# Patient Record
Sex: FEMALE | Race: WHITE
[De-identification: names, ages, dates, MRNs, and addresses within clinical notes are randomized per-mention and may not be internally consistent; named-entity substitution may affect disease eponyms.]

---

## 2019-06-14 NOTE — EKG
[place] [address]
Phone:  [phone]                     ELECTROCARDIOGRAM REPORT      
_______________________________________________________________________________
 
Name:       MERRY CASTILLO                 Room:                      REG ER  
M.R.#:  [id number]      Account #:      [id number]  
Admission:  19     Attend Phys:                         
Discharge:               Date of Birth:  [birth year]  
         Report #: [id number]
    [id number]
_______________________________________________________________________________
THIS REPORT FOR:  //name//                      
 
                         [facility]
                                       
Test Date:    2019               Test Time:    13:53:32
Pat Name:     MERRY CASTILLO             Department:   
Patient ID:   [id number]            Room:          
Gender:       F                        Technician:   GIOVANNA
:          1945               Requested By: Hannah Bates
Order Number: [id number] MD:   Emeterio Leos
                                 Measurements
Intervals                              Axis          
Rate:         74                       P:            23
MO:           146                      QRS:          16
QRSD:         92                       T:            40
QT:           424                                    
QTc:          471                                    
                           Interpretive Statements
Sinus rhythm
Low voltage, precordial leads
No previous ECG available for comparison
 
Electronically Signed On 2019 14:57:02 CDT by Emeterio Leos
[email or web address]
 
 
 
 
 
 
 
 
 
 
 
 
 
 
 
 
 
 
 
  <ELECTRONICALLY SIGNED>
                                           By: Emeterio Leos MD, [facility]      
  19     1457
D: 19 1353   _____________________________________
T: 19 1353   Emeterio Leos MD, FACC        /EPI

## 2019-06-16 NOTE — CON
[address]  [address]                    CONSULTATION                  
_______________________________________________________________________________
 
Name:       JONATHANMERRY J                 Room:           [address] [place]  
.R.#:  [id number]      Account #:      [id number]  
Admission:  06/14/19     Attend Phys:    NELDA Fulton
Discharge:               Date of Birth:  [birth year]  
         Report #: [id number]
                                                                     [id number]  
_______________________________________________________________________________
THIS REPORT FOR:  //name//                      
 
CC: SIOBHAN physician/PCP
    Luis Dobbins
 
CARDIOLOGY CONSULTATION
 
HISTORY OF PRESENT ILLNESS:  I was asked by Dr. Dobbins to see this 73-year-old
white female in cardiology consultation for evaluation and treatment of atrial
fibrillation with a rapid ventricular response.  This lady was admitted on
06/14/2019 with an acute GI bleed.  She had a severe GI bleed and was
hypotensive and actually had syncope with it.  Apparently, she has a bleeding
duodenal ulcer.  She was intubated in the ER the day of the admission to protect
her airway apparently.  She does have a history of COPD also.  She does, in
fact, have a past history of atrial fibrillation as well.  She was in sinus
rhythm until this morning.  Of note, is that they have had difficulty managing
this lady's blood pressure.  She has remained hypotensive since her admission. 
She has required Levophed.  She also has required DuoNeb that she has been
getting every 4 hours.  This morning, she became hypotensive again.  Last night,
her hemoglobin was 8.8 at 10:00 last night.  This morning with this event, her
hemoglobin did drop to 7.1.  The presumption is that she has bled again.  She
went into atrial fibrillation with a rapid ventricular response earlier this
morning, initially it was thought to be paroxysmal supraventricular tachycardia,
because it was extremely rapid and fairly regular, although on close
examination, the rhythm is in fact not completely regular.  Her initial heart
rate was 190.  She was given adenosine and metoprolol and the heart rate slowed
and has remained a bit slower, but she was running up to 150 at times and it
became clear it was atrial fibrillation.  She is on 12 mcg of Levophed and
cannot get diltiazem or a beta blocker for this rhythm, so she has gotten a
couple of doses of digoxin.  If she fails to respond with a lower heart rate
with digoxin, then we will have to give her amiodarone.  She was given a bolus
of fluids.  She is less hypotensive now, she is going to get blood.  She is
currently stable, but remains critically ill.
 
PAST MEDICAL HISTORY:  Also includes history of CVA in 2006, I believe it was on
the right side of her head.  She has history of essential hypertension, diabetes
mellitus and hypercholesterolemia and was on medication for those.  She has
insulin-dependent diabetes mellitus.  She cannot give a history, she is
intubated and is sedated and on ventilator.
 
PAST MEDICAL HISTORY:  As described above.
 
ALLERGIES:  She is allergic to METFORMIN AND PENICILLIN.
 
REVIEW OF SYSTEMS:  She cannot give review of systems.
 
 
 
 
[place] [address]                    CONSULTATION                  
_______________________________________________________________________________
 
Name:       MERRY CASTILLO                 Room:           [address] [place]  
[facility]#:  [id number]      Account #:      [id number]  
Admission:  06/14/19     Attend Phys:    NELDA Fulton
Discharge:               Date of Birth:  [birth year]  
         Report #: [id number]
                                                                     [id number]  
_______________________________________________________________________________
SOCIAL HISTORY:  She cannot give social history or family history.  Apparently,
she does not smoke or drink or use illegal drugs.
 
PHYSICAL EXAMINATION:
GENERAL:  She presents as a well-developed, well-nourished white female in no
acute distress.  She is intubated, sedated and on the ventilator.  She is
unresponsive.
VITAL SIGNS:  Her pulse is currently 118, her systolic pressure is 100,
respirations are 16, and she is afebrile.  Her most recent temperature was 98.1.
HEENT:  Her head is atraumatic.  Eyes clear.
NECK:  Supple.  There is no jugular venous distention or hepatojugular reflux. 
Thyroid is not enlarged.  There is no adenopathy.
SKIN:  Warm and dry.  Mucous membranes are moist.
LUNGS:  Clear to auscultation and percussion.
HEART:  Revealed normal first and second heart sound.  There is no S4, no S3, no
murmurs, rubs, thrills, heaves.  The rhythm is irregularly irregular, rate is
approximately 120.  PMI is not displaced.
ABDOMEN:  Soft, flat, nontender, no palpable masses, no organomegaly.
EXTREMITIES:  Reveal no cyanosis, clubbing or edema.
NEUROLOGIC:  The patient was unresponsive and did not move her extremities.  She
is intubated and sedated.
 
DIAGNOSTIC STUDIES:  Her most recent EKG from this morning at 5:20 a.m. shows
atrial fibrillation.  There is a possible old anteroseptal infarct.  Her heart
rate was 123 at that time.  Chest x-ray from yesterday showed clear lungs.  I do
not think she has had one yet today.  On yesterday's x-ray, the cardiac
silhouette was normal.  ET tube was in normal position.
 
IMPRESSION:
1.  Atrial fibrillation with a rapid ventricular response.
2.  Hypovolemic shock.
3.  Gastrointestinal bleed.
4.  Chronic obstructive pulmonary disease with acute respiratory failure.
5.  Anemia.
6.  History of essential hypertension.
7.  Insulin-dependent diabetes mellitus.
8.  Hypercholesterolemia.
9.  History of cerebrovascular accident.
 
RECOMMENDATION:  She has already gotten a couple of doses of digoxin.  If her
heart rate does not begin to slow further, I would start her on amiodarone to
slow her and hopefully convert her back to sinus rhythm.  She should not be
anticoagulated.  I would transfuse her.  I would notify GI that she is probably
having a GI bleed.  She has already gotten a bolus of fluids.  She may require
more fluids.  I would discontinue DuoNeb and switch her to Xopenex, as it is
less arrhythmogenic.  If her blood pressure comes up, I would taper her off the
 
 
 
[address] [place] [address]                    CONSULTATION                  
_______________________________________________________________________________
 
Name:       MERRY CASTILLO                 Room:           [address]    [place]  
..#:  [id number]      Account #:      [id number]  
Admission:  06/14/19     Attend Phys:    NELDA Fulton
Discharge:               Date of Birth:  [birth year]  
         Report #: [id number]
                                                                     [id number]  
_______________________________________________________________________________
Levophed, as it is also arrhythmogenic.  I would consider Amadeo-Synephrine in
place of it, if she continues needing pressor support.  Amadeo-Synephrine is not
arrhythmogenic.
 
Thank you very much for asking me to the patient.  If any questions, please feel
free to contact me.
 
 
 
 
 
 
 
 
 
 
 
 
 
 
 
 
 
 
 
 
 
 
 
 
 
 
 
 
 
 
 
 
 
 
 
 
 
 
<ELECTRONICALLY SIGNED>
                                        By:  Emeterio Leos MD, [facility]      
06/16/19     2041
D: 06/16/19 0833_______________________________________
T: 06/16/19 0925F. Tom Talley MD, FACC       /nt

## 2019-06-17 NOTE — CON
[address]  [address]                    CONSULTATION                  
_______________________________________________________________________________
 
Name:       JONATHANMERRY J                 Room:           [address]    [place]  
M.R.#:  [id number]      Account #:      [id number]  
Admission:  06/14/19     Attend Phys:    NELDA Fulton
Discharge:               Date of Birth:  [birth year]  
         Report #: [id number]
                                                                     [id number]  
_______________________________________________________________________________
THIS REPORT FOR:  //name//                      
 
CC: SIOBHAN physician/PCP
    Luis Dobbins
 
DATE OF SERVICE:  06/15/2019
 
 
REASON FOR CONSULT:  Gastrointestinal bleed.
 
HISTORY OF PRESENT ILLNESS:  This is a 73-year-old female who was admitted to
hospital last night with hematemesis.  The patient apparently was found
unconscious at gas station and was brought to the hospital by EMS.  Upon
hospitalization, the patient was alert, but reported that she was tired.  Her
hemoglobin was in range of 8, but she was transfused since she was vomiting a
lot of blood.  Since then, the patient has been intubated and is on pressors. 
She is maxed out on Levophed of 30.  She still is extremely hypotensive and
tachycardic with heart rate of 130s.  She also has leukocytosis with WBC in
range of mid 20s.  She initially was on propofol, but she has been off of
propofol and is not responsive.  She has a NG tube in place, which has some dark
blood in it.  The patient also is on Coumadin and she was on hypercoagulable
state with INR of 5.5 on admission.
 
Since her ER visit, we had ordered to reverse INR with FFP and her INR this
morning is 1.3.  Post-transfusion, her hemoglobin went up to 10, but this
morning had dropped to 6.  We also had started her on a Protonix drip since
admission.
 
PAST MEDICAL HISTORY:  Significant for history of hyperlipidemia, diabetes,
hypertension, atrial fibrillation, history of cerebrovascular accident in 2006
and chronic anticoagulation therapy.
 
ALLERGIES:  Significant to METFORMIN AND PENICILLIN.
 
SOCIAL HISTORY:  Unknown as the patient is nonresponsive.
 
FAMILY HISTORY:  Unknown as well.
 
PHYSICAL EXAMINATION:
GENERAL:  Reveals a 73-year-old female with acute gastrointestinal bleeds, who
is intubated, off of sedation, but nonresponsive; tachycardic and hypotensive,
on Levophed.
VITAL SIGNS:  Include blood pressure of 89/41, respirations 21, pulse 128 and
temperature is 100.2.
LUNGS:  Bilateral breath sounds are heard, note the patient is intubated.
CARDIOVASCULAR:  Tachycardic.  Regular rate.
 
 
 
[place] [address]                    CONSULTATION                  
_______________________________________________________________________________
 
Name:       MERRY CASTILLO PILO                 Room:           [address] [place]  
[facility]#:  [id number]      Account #:      [id number]  
Admission:  06/14/19     Attend Phys:    NELDA Fulton
Discharge:               Date of Birth:  [birth year]  
         Report #: [id number]
                                                                     [id number]  
_______________________________________________________________________________
ABDOMEN:  Soft.  Bowel sounds are present.  There is no rebound or guarding.
NEUROLOGIC:  The patient is off sedation, but nonresponsive.
 
LABORATORY DATA:  Reveal sodium of 144, potassium 3.5, BUN is 27, creatinine
1.2, glucose is 419, AST is 29, ALT 31, alkaline phosphatase 71 and calcium is
7.7.  Albumin is 2.3.  Lactic acid is 5.2 with troponin of 0.07.  INR is 1.3. 
WBC is 15.3 with hemoglobin of 6.0 and platelets of 171.
 
IMAGING:  CT of abdomen and pelvis was obtained on admission.  There is a large
left pneumothorax noted on presentation and has been addressed with placing a
chest tube.  There was a large amount of mixed density fluid and some gas in the
stomach, which may be consistent with upper gastrointestinal bleed.  There was
also cholelithiasis and small amount of free fluid in the pelvis.
 
ASSESSMENT AND PLAN:  The patient with multiple medical problems who presents
with acute gastrointestinal bleed and shock, who is on maximum dose of pressors
and intubated.  Since her hemoglobin dropped from 10 to 6, we will go ahead and
perform upper endoscopy.  We continue to monitor H and H and keep hemoglobin
between 7 and 8.  Meanwhile, we will continue Protonix drip and make further
recommendation based on upper endoscopic finding.
 
 
 
 
 
 
 
 
 
 
 
 
 
 
 
 
 
 
 
 
 
 
 
 
<ELECTRONICALLY SIGNED>
                                        By:  Steffen Almanzar MD            
06/17/19     1538
D: 06/15/19 1203_______________________________________
T: 06/15/19 1840Steffen Almanzar MD               /nt

## 2019-06-17 NOTE — EKG
[place] [address]
Phone:  [phone]                     ELECTROCARDIOGRAM REPORT      
_______________________________________________________________________________
 
Name:       MERRY CASTILLO                 Room:           [address]    [place]  
M.R.#:  [id number]      Account #:      [id number]  
Admission:  19     Attend Phys:    NELDA Fulton
Discharge:               Date of Birth:  [birth year]  
         Report #: [id number]
    [id number]
_______________________________________________________________________________
THIS REPORT FOR:  //name//                      
 
                          [facility]
                                       
Test Date:    2019               Test Time:    05:02:41
Pat Name:     MERRY CASTILLO             Department:   
Patient ID:   [id number]            Room:         [address]
Gender:       F                        Technician:   FERMIN
:          1945               Requested By: Luis Dobbins
Order Number: [id number]    Keven MD:   Emeterio Leos
                                 Measurements
Intervals                              Axis          
Rate:         190                      P:            0
DC:                                    QRS:          0
QRSD:         71                       T:            182
QT:           245                                    
QTc:          436                                    
                           Interpretive Statements
Supraventricular tachycardia
Low voltage, extremity and precordial leads
Repolarization abnormality, prob rate related
 
 
Electronically Signed On 2019 13:57:57 CDT by Emeterio Leos
[email or web address]
 
 
 
 
 
 
 
 
 
 
 
 
 
 
 
 
 
 
  <ELECTRONICALLY SIGNED>
                                           By: Emeterio Leos MD, [facility]      
  19     1357
D: [address]   _____________________________________
T: 19   Emeterio Leos MD, FACC        /EPI

## 2019-06-17 NOTE — EKG
[place] [address]
Phone:  [phone]                     ELECTROCARDIOGRAM REPORT      
_______________________________________________________________________________
 
Name:       MERRY CASTILLO                 Room:           [address]    [place]  
M.R.#:  [id number]      Account #:      [id number]  
Admission:  19     Attend Phys:    NELDA Fulton
Discharge:               Date of Birth:  [birth year]  
         Report #: [id number]
    [id number]
_______________________________________________________________________________
THIS REPORT FOR:  //name//                      
 
                          [facility]
                                       
Test Date:    2019-06-15               Test Time:    04:43:05
Pat Name:     MERRY CASTILLO             Department:   
Patient ID:   [id number]            Room:         [address]
Gender:       F                        Technician:   FERMIN
:          1945               Requested By: Luis Dobbins
Order Number: [id number]    Keven MD:   Emeterio Leos
                                 Measurements
Intervals                              Axis          
Rate:         110                      P:            -7
IA:           147                      QRS:          23
QRSD:         83                       T:            239
QT:           343                                    
QTc:          465                                    
                           Interpretive Statements
Sinus tachycardia
Low voltage, precordial leads
Nonspecific repol abnormality, diffuse leads
Compared to ECG 2019 13:53:32
Early repolarization now present
Sinus rhythm no longer present
 
Electronically Signed On 2019 13:46:59 CDT by Emeterio Leos
[email or web address]
 
 
 
 
 
 
 
 
 
 
 
 
 
 
 
 
  <ELECTRONICALLY SIGNED>
                                           By: Emeterio Leos MD, FAC      
  19     1346
D: 06/15/19 0443   _____________________________________
T: 06/15/19 0443   Emeterio Leos MD, [facility]        /EPI

## 2019-06-17 NOTE — EKG
[place] [address]
Phone:  [phone]                     ELECTROCARDIOGRAM REPORT      
_______________________________________________________________________________
 
Name:       MERRY CASTILLO                 Room:           [address]    [place]  
M.R.#:  [id number]      Account #:      [id number]  
Admission:  19     Attend Phys:    NELDA Fulton
Discharge:               Date of Birth:  [birth year]  
         Report #: [id number]
    [id number]
_______________________________________________________________________________
THIS REPORT FOR:  //name//                      
 
                          [facility]
                                       
Test Date:    2019               Test Time:    09:13:03
Pat Name:     MERRY CASTILLO             Department:   
Patient ID:   [id number]            Room:         [address]
Gender:       F                        Technician:   
:          1945               Requested By: JASKARAN Talley
Order Number: [id number]    Keven MD:   Emeterio Leos
                                 Measurements
Intervals                              Axis          
Rate:         75                       P:            16
NM:           150                      QRS:          29
QRSD:         81                       T:            33
QT:           474                                    
QTc:          530                                    
                           Interpretive Statements
Sinus rhythm
Low voltage, extremity leads
Prolonged QT interval
Baseline wander in lead(s) II,aVF
 
 
Electronically Signed On 2019 16:54:33 CDT by Emeterio Leos
[email or web address]
 
 
 
 
 
 
 
 
 
 
 
 
 
 
 
 
 
  <ELECTRONICALLY SIGNED>
                                           By: Emeterio Leos MD, [facility]      
  19     1654
D: 19   _____________________________________
T: 19   Emeterio Leos MD, FAC        /EPI

## 2019-06-17 NOTE — EKG
[place] [address]
Phone:  [phone]                     ELECTROCARDIOGRAM REPORT      
_______________________________________________________________________________
 
Name:       MERRY CASTILLO                 Room:           [address]    [place]  
M.R.#:  [id number]      Account #:      [id number]  
Admission:  19     Attend Phys:    NELDA Fulton
Discharge:               Date of Birth:  [birth year]  
         Report #: [id number]
    [id number]
_______________________________________________________________________________
THIS REPORT FOR:  //name//                      
 
                          [facility]
                                       
Test Date:    2019               Test Time:    09:05:26
Pat Name:     MERRY CASTILLO             Department:   
Patient ID:   [id number]            Room:         [address]
Gender:       F                        Technician:   [facility]
:          1945               Requested By: Sarmad Gaffney
Order Number: [id number]    Keven MD:   Emeterio Leos
                                 Measurements
Intervals                              Axis          
Rate:         121                      P:            57
MS:           144                      QRS:          2
QRSD:         73                       T:            211
QT:           291                                    
QTc:          413                                    
                           Interpretive Statements
Sinus tachycardia
Low voltage, extremity and precordial leads
septal infarct, old
Borderline repolarization abnormality
 
 
Electronically Signed On 2019 14:30:18 CDT by Emeterio Leos
[email or web address]
 
 
 
 
 
 
 
 
 
 
 
 
 
 
 
 
 
  <ELECTRONICALLY SIGNED>
                                           By: Emeterio Leos MD, [facility]      
  19     1430
D: 19   _____________________________________
T: 19   Emeterio Leos MD, FACC        /EPI

## 2019-06-17 NOTE — CON
[address]  [address]                    CONSULTATION                  
_______________________________________________________________________________
 
Name:       MERRY CASTILLO                 Room:           [address]    ADM IN  
M.R.#:  [id number]      Account #:      [id number]  
Admission:  06/14/19     Attend Phys:    NELDA Fulton
Discharge:               Date of Birth:  [birth year]  
         Report #: [id number]
                                                                     [id number]  
_______________________________________________________________________________
THIS REPORT FOR:  //name//                      
 
CC: SIOBHAN physician/PCP
    Luis Dobbins
 
REASON FOR CONSULTATION:  Respiratory failure, ventilator management.
 
HISTORY OF PRESENT ILLNESS:  Please note the patient is intubated, on sedation
during my visit, did not participate in the history; discussed with the nursing
staff and reviewed medical record.  She is a 73-year-old female patient, who was
brought in by EMS.  Apparently, she had episodes of vomiting and loss of
consciousness while she was in a gas station.  EMS reported that she was
unconscious, although she gained consciousness en route to the emergency room. 
Apparently, she did not recall any knowledge about what happened.  She has a
C-collar in place.  She denied abdominal pain, fever, or rash to the admitting
physician; however, at one point, she was intubated for airway protection.  She
had a central line placed and pneumothorax was found.  She also ended up with a
chest tube placement.  She still has gastric tube with some bloody secretions. 
During my visit, she was on Levophed, although they were going down on it.  She
was on 2 mcg of Levophed compared to 5 overnight.  She is on 6 mg Versed.
 
ALLERGIES:  Per the record, PENICILLIN AND METFORMIN.
 
PAST MEDICAL HISTORY:  Includes atrial fibrillation, diabetes mellitus, stroke,
and hysterectomy.
 
PAST SURGICAL HISTORY:  Includes hysterectomy as above.
 
HOME MEDICATIONS:  Aspirin, calcium, metoprolol, Coumadin.
 
FAMILY HISTORY:  Not obtainable.
 
REVIEW OF SYSTEMS:  Not obtainable due to the patient's condition.
 
PHYSICAL EXAMINATION:
GENERAL:  She is intubated, on sedation, no distress, chest tube in place, ET
tube in place.
HEENT:  Oral cavity, moist mucous membrane; head, normocephalic, atraumatic;
slightly pale; pupils reactive to light; external ears look normal; nasal
cavity, patent passages.
NECK:  Full range of movement, nontender.  No masses felt, no rebound, no
rigidity.
CHEST:  Air movement heard bilaterally; no crackles, no wheezes; chest tube on
the left side with very rare occasional leak.
HEART:  S1 and S2, no murmur.
ABDOMEN:  Benign, soft, lax, nontender, positive bowel sounds.
 
 
 
[place] [address]                    CONSULTATION                  
_______________________________________________________________________________
 
Name:       MERRY CASTILLO                 Room:           [address]#:  [id number]      Account #:      [id number]  
Admission:  06/14/19     Attend Phys:    NELDA Fulton
Discharge:               Date of Birth:  [birth year]  
         Report #: [id number]
                                                                     [id number]  
_______________________________________________________________________________
EXTREMITIES:  Lower extremity, no edema noted.  I did not take the sedation off.
SKIN:  No rash.
LYMPHATICS:  No palpable lymph node.
NEUROLOGIC:  Sedated.
 
LABORATORY DATA:  White blood count 16.7, hemoglobin 9.5, and platelets of 265. 
Her INR was 5.5; however, post hospitalization today is 1.4.  ABGs, she had
multiple sets of ABGs reviewed.  The last ABG this morning, 7.48/26/170 and that
was on 550 of tidal volume, 50% FiO2, and rate of 16.  Since then, the volume
was adjusted and FiO2 was adjusted.  Her creatinine is 1.2, BUN is 27,
bicarbonate 20, potassium 3.5, and sodium 144.  She had multiple imaging that
was reviewed.  Initial chest x-ray did not show pneumothorax, but after
intubation, the chest x-ray showed the pneumothorax.  The chest x-ray this
morning, left-sided chest tube was noted and ET tube in good position with clear
lungs.
 
IMPRESSION:
1.  Acute respiratory failure.
2.  Gastrointestinal bleed.
3.  Syncopal episode.
4.  Pneumothorax, status post chest tube placement.
5.  Supratherapeutic INR, on anticoagulation, likely due to atrial fibrillation.
6.  Atrial fibrillation.
 
PLAN:  I agree with changing sedation to propofol if her blood pressure can
tolerate, wean pressor down as tolerated, monitor fluid status, and avoid fluid
overload.  I would keep the chest tube to -30 suction; likely, we will keep the
chest tube until after extubation.  Monitor hemoglobin and transfuse as needed. 
My understanding is she received blood transfusion and fresh frozen plasma,
Gastroenterology to evaluate.  I will continue the current ventilator setting as
suggested above.  If cleared by Gastroenterology, we will start weaning trials
hopefully tomorrow depending on the progress of the gastrointestinal workup and
her hemodynamic status.
 
We will follow up on chest x-ray and arterial blood gases.
 
Critical care time 40 minutes.
 
 
 
 
 
 
 
<ELECTRONICALLY SIGNED>
                                        By:  Héctor Blanco MD              
06/17/19     0953
D: 06/15/19 0815_______________________________________
T: 06/15/19 0924Héctor Blanco MD                 /nt

## 2019-06-17 NOTE — 2DMMODE
[place] [address]
Phone:  [phone] 2 D/M-MODE ECHOCARDIOGRAM     
_______________________________________________________________________________
 
Name:         CASTILLOMERRY J                Room:          003-P    ADM IN 
[facility]#:    [id number]     Account #:     [id number]  
Admission:    19    Attend Phys:   Luis Dobbins
Discharge:                Date of Birth: [birth year]  
Date of Service: 19 1638  Report #:      [id number]
        [id number]
_______________________________________________________________________________
THIS REPORT FOR:  //name//                      
 
 
--------------- APPROVED REPORT --------------
 
 
Study performed:  2019 14:17:14
 
EXAM: Comprehensive 2D, Doppler, and color-flow 
Echocardiogram 
Patient Location: In-Patient   
Room #:  003     Status:  routine
 
      BSA:         1.83
HR: 77 bpm BP:          112/37 mmHg 
Rhythm: NSR     
 
Other Information 
Study Quality: Good
 
Indications
Atrial Fibrillation
 
2D Dimensions
IVSd:  9.43 (7-11mm) LVOT Diam:  19.40 (18-24mm) 
LVDd:  38.03 mm  
PWd:  9.92 (7-11mm) Ascending Ao:  29.78 (22-36mm)
LVDs:  20.03 (25-40mm) 
Aortic Root:  29.86 mm 
 
Volumes
Left Atrial Volume (Systole) 
    LA ESV Index:  20.00 mL/m2
 
Aortic Valve
AoV Peak Kalen.:  1.67 m/s 
AO Peak Gr.:  11.13 mmHg LVOT Max P.36 mmHg
AO Mean Gr.:  5.89 mmHg  LVOT Mean P.66 mmHg
    LVOT Max V:  1.16 m/s
AO V2 VTI:  28.51 cm  LVOT Mean V:  0.75 m/s
MARIANA (VTI):  2.61 cm2  LVOT V1 VTI:  25.21 cm
 
Mitral Valve
    E/A Ratio:  1.19
    MV Decel. Time:  211.34 ms
MV E Max Kalen.:  0.88 m/s 
 
 
[place] [address]
Phone:  [phone]                     2 D/M-MODE ECHOCARDIOGRAM     
_______________________________________________________________________________
 
Name:         MERRY CASTILLO                Room:          [address] [place] 
..#:    [id number]     Account #:     [id number]  
Admission:    19    Attend Phys:   Luis Dobbins
Discharge:                Date of Birth: [birth year]  
Date of Service: 19 1638  Report #:      [id number]
        [id number]
_______________________________________________________________________________
MV PHT:  61.29 ms  
MVA (PHT):  3.59 cm2  
 
TDI
E/Lateral E':  8.80 E/Medial E':  11.00
   Medial E' Kalen.:  0.08 m/s
   Lateral E' Kalen.:  0.10 m/s
 
Pulmonary Valve
PV Peak Kalen.:  1.09 m/s PV Peak Gr.:  4.76 mmHg
 
Left Ventricle
The left ventricle is normal size. There is normal LV segmental wall 
motion. There is normal left ventricular wall thickness. Left 
ventricular systolic function is normal. The left ventricular 
ejection fraction is within the normal range. LVEF is 60-65%. The 
left ventricular diastolic function is normal.
 
Right Ventricle
The right ventricle is normal size. The right ventricular systolic 
function is normal.
 
Atria
The left atrium size is normal. The right atrium size is 
normal.
 
Aortic Valve
Aortic valve leaflets are mildly thickened. No aortic regurgitation 
is present. There is no aortic valvular stenosis.
 
Mitral Valve
There is mitral annular calcification. Trace mitral regurgitation. No 
evidence of mitral valve stenosis.
 
Tricuspid Valve
The tricuspid valve is normal in structure. Trace tricuspid 
regurgitation. Unable to assess PA pressure.
 
Pulmonic Valve
The pulmonary valve is normal in structure. There is no pulmonic 
valvular regurgitation.
 
Great Vessels
The aortic root is normal in size. IVC is not 
visualized.
 
 
 
[place] [address]
Phone:  [phone] 2 D/M-MODE ECHOCARDIOGRAM     
_______________________________________________________________________________
 
Name:         MERRY CASTILLO                Room:          [address] [place] 
[facility]#:    [id number]     Account #:     [id number]  
Admission:    19    Attend Phys:   Luis Dobbins
Discharge:                Date of Birth: [birth year]  
Date of Service: 19 1638  Report #:      [id number]
        [id number]
_______________________________________________________________________________
Pericardium
There is no pericardial effusion.
 
<Conclusion>
LVEF is 60-65%.
Aortic valve leaflets are mildly thickened.
 
 
 
 
 
 
 
 
 
 
 
 
 
 
 
 
 
 
 
 
 
 
 
 
 
 
 
 
 
 
 
 
 
 
 
 
 
 
  <ELECTRONICALLY SIGNED>
                                           By: Emeterio Leos MD, [facility]      
  19     1638
D: 19 1638   _____________________________________
T: 19   Emeterio Leos MD, [facility]        /INF

## 2019-06-17 NOTE — EKG
[place] [address]
Phone:  [phone]                     ELECTROCARDIOGRAM REPORT      
_______________________________________________________________________________
 
Name:       MERRY CASTILLO                 Room:           [address]    ADM IN  
M.R.#:  [id number]      Account #:      [id number]  
Admission:  19     Attend Phys:    NELDA Fulton
Discharge:               Date of Birth:  [birth year]  
         Report #: [id number]
    [id number]
_______________________________________________________________________________
THIS REPORT FOR:  //name//                      
 
                          [facility]
                                       
Test Date:    2019               Test Time:    05:20:49
Pat Name:     MERRY CASTILLO             Department:   
Patient ID:   [id number]            Room:         [address]
Gender:       F                        Technician:   FERMIN
:          1945               Requested By: JASKARAN Talley
Order Number: [id number]    Keven MD:   Emeterio Leos
                                 Measurements
Intervals                              Axis          
Rate:         123                      P:            
ND:                                    QRS:          8
QRSD:         80                       T:            113
QT:           318                                    
QTc:          455                                    
                           Interpretive Statements
Atrial fibrillation
Anteroseptal infarct, age indeterminate
 
 
Electronically Signed On 2019 13:59:23 CDT by Emeterio Leos
[email or web address]
 
 
 
 
 
 
 
 
 
 
 
 
 
 
 
 
 
 
 
  <ELECTRONICALLY SIGNED>
                                           By: Emeterio Leos MD, [facility]      
  19     1359
D: 19 0520   _____________________________________
T: 19 0520   Emeterio Leos MD, FACC        /EPI

## 2019-06-18 NOTE — EKG
[place] [address]
Phone:  [phone]                     ELECTROCARDIOGRAM REPORT      
_______________________________________________________________________________
 
Name:       MERRY CASTILLO                 Room:           [address]    ADM IN  
M.R.#:  [id number]      Account #:      [id number]  
Admission:  19     Attend Phys:    NELDA Fulton
Discharge:               Date of Birth:  [birth year]  
         Report #: [id number]
    [id number]
_______________________________________________________________________________
THIS REPORT FOR:  //name//                      
 
                          [facility]
                                       
Test Date:    2019               Test Time:    08:32:05
Pat Name:     MERRY CASTILLO             Department:   
Patient ID:   [id number]            Room:         [address]
Gender:       F                        Technician:   
:          1945               Requested By: JASKARAN Talley
Order Number: [id number]    Reading MD:   Castillo Mark
                                 Measurements
Intervals                              Axis          
Rate:         82                       P:            21
MO:           154                      QRS:          35
QRSD:         100                      T:            14
QT:           392                                    
QTc:          458                                    
                           Interpretive Statements
Sinus rhythm
Low voltage, precordial leads
Baseline wander in lead(s) II,III,aVF
Compared to ECG 2019 09:13:03
Prolonged QT interval no longer present
 
Electronically Signed On 2019 11:36:29 CDT by Castillo Mark
[email or web address]
 
 
 
 
 
 
 
 
 
 
 
 
 
 
 
 
 
  <ELECTRONICALLY SIGNED>
                                           By: Castillo Mark MD, FAC   
  19     1136
D: 19 0832   _____________________________________
T: 19 0832   Castillo Mark MD, [facility]     /EPI

## 2019-06-19 NOTE — EKG
[place] [address]
Phone:  [phone]                     ELECTROCARDIOGRAM REPORT      
_______________________________________________________________________________
 
Name:       MERRY CASTILLO                 Room:           [address]    ADM IN  
M.R.#:  [id number]      Account #:      [id number]  
Admission:  19     Attend Phys:    NELDA Fulton
Discharge:               Date of Birth:  [birth year]  
         Report #: [id number]
    [id number]
_______________________________________________________________________________
THIS REPORT FOR:  //name//                      
 
                          [facility]
                                       
Test Date:    2019               Test Time:    08:54:26
Pat Name:     MERRY CASTILLO             Department:   
Patient ID:   [id number]            Room:         [address]
Gender:       F                        Technician:   
:          1945               Requested By: JASKARAN Talley
Order Number: [id number]    Keven MD:   Emeterio Leos
                                 Measurements
Intervals                              Axis          
Rate:         78                       P:            -1
WA:           164                      QRS:          54
QRSD:         103                      T:            28
QT:           404                                    
QTc:          461                                    
                           Interpretive Statements
Sinus rhythm
Low voltage, extremity leads
Compared to ECG 2019 08:32:05
No significant changes
 
Electronically Signed On 2019 11:07:54 CDT by Emeterio Leos
[email or web address]
 
 
 
 
 
 
 
 
 
 
 
 
 
 
 
 
 
 
  <ELECTRONICALLY SIGNED>
                                           By: Emeterio Leos MD, [facility]      
  19     1107
D: 19 0854   _____________________________________
T: 19 0854   Emeterio Leos MD, FAC        /EPI

## 2019-06-20 NOTE — EKG
[place] [address]
Phone:  [phone]                     ELECTROCARDIOGRAM REPORT      
_______________________________________________________________________________
 
Name:       MERRY CASTILLO                 Room:           [address]    ADM IN  
.R.#:  [id number]      Account #:      [id number]  
Admission:  19     Attend Phys:    NELDA Fulton
Discharge:               Date of Birth:  [birth year]  
         Report #: [id number]
    [id number]
_______________________________________________________________________________
THIS REPORT FOR:  //name//                      
 
                          [facility]
                                       
Test Date:    2019               Test Time:    10:19:47
Pat Name:     MERRY CASTILLO             Department:   
Patient ID:   [id number]            Room:         [facility]
Gender:       F                        Technician:   
:          1945               Requested By: JASKARAN Talley
Order Number: [id number]    Reading MD:   Castillo Mark
                                 Measurements
Intervals                              Axis          
Rate:         79                       P:            9
N[place]:           153                      QRS:          18
QRSD:         87                       T:            -4
QT:           414                                    
QTc:          475                                    
                           Interpretive Statements
Sinus rhythm
Borderline T abnormalities, anterior leads
Compared to ECG 2019 08:54:26
T-wave abnormality now present
 
Electronically Signed On 2019 11:24:21 CDT by Castillo Mark
[email or web address]
 
 
 
 
 
 
 
 
 
 
 
 
 
 
 
 
 
 
  <ELECTRONICALLY SIGNED>
                                           By: Castillo Mark MD, [facility]   
  19     1124
D: 19 1019   _____________________________________
T: 19 1019   Castillo Mark MD, [facility]     /EPI

## 2019-08-15 ENCOUNTER — HOSPITAL ENCOUNTER (OUTPATIENT)
Dept: HOSPITAL 96 - M.LAB | Age: 74
End: 2019-08-15
Attending: ANESTHESIOLOGY
Payer: COMMERCIAL

## 2019-08-15 DIAGNOSIS — E11.9: ICD-10-CM

## 2019-08-15 DIAGNOSIS — Z51.81: Primary | ICD-10-CM

## 2019-08-15 LAB
INR PPP: 2
PROTHROMBIN TIME: 19.8 SECONDS (ref 9.2–11.5)